# Patient Record
Sex: FEMALE | Race: AMERICAN INDIAN OR ALASKA NATIVE
[De-identification: names, ages, dates, MRNs, and addresses within clinical notes are randomized per-mention and may not be internally consistent; named-entity substitution may affect disease eponyms.]

---

## 2019-02-03 ENCOUNTER — HOSPITAL ENCOUNTER (EMERGENCY)
Dept: HOSPITAL 5 - ED | Age: 39
LOS: 1 days | Discharge: HOME | End: 2019-02-04
Payer: COMMERCIAL

## 2019-02-03 VITALS — DIASTOLIC BLOOD PRESSURE: 58 MMHG | SYSTOLIC BLOOD PRESSURE: 185 MMHG

## 2019-02-03 DIAGNOSIS — K02.9: Primary | ICD-10-CM

## 2019-02-03 DIAGNOSIS — Z90.710: ICD-10-CM

## 2019-02-03 DIAGNOSIS — K05.10: ICD-10-CM

## 2019-02-03 DIAGNOSIS — J44.9: ICD-10-CM

## 2019-02-03 PROCEDURE — 99282 EMERGENCY DEPT VISIT SF MDM: CPT

## 2019-02-03 NOTE — EMERGENCY DEPARTMENT REPORT
HPI





- General


Chief Complaint: Dental/Oral


Time Seen by Provider: 02/03/19 23:08





- HPI


HPI: 





Patient reported that she is having dental pain and it has been going on for a 

few days and she is here to get treatment.  Denies any nausea or vomiting or 

nasal congestion.  Denies any fevers chills.  Pain is 10 out of 10 and achy 

worsen or eating and talking and no alleviating factors.  No medication taken 

prior to coming to the emergency room.  Denies any cough or congestion .  Denies

any chest pain or shortness of breath.  Patient report right dental pain does 

radiate into her left ear.





ED Past Medical Hx





- Past Medical History


Previous Medical History?: Yes


Hx Seizures: Yes


Hx COPD: Yes


Additional medical history: Epilepsy, GI issues,VERTIGO





- Surgical History


Past Surgical History?: Yes


Additional Surgical History: Hysterectomy, C section,TUBILIGATION,ABD 

BIOPSIES,D/C





- Family History


Family history: hypertension





- Social History


Smoking Status: Never Smoker


Substance Use Type: None





- Medications


Home Medications: 


                                Home Medications











 Medication  Instructions  Recorded  Confirmed  Last Taken  Type


 


ALBUTEROL Inhaler (OR & NICU) 2 puff IH QID PRN #1 inhalation 10/24/18  Unknown 

Rx





[ProAir HFA Inhaler]     


 


Azithromycin [Zithromax Z-ERIKA] 250 mg PO DAILY #6 tablet 10/24/18  Unknown Rx


 


Benzonatate [Tessalon Perles] 100 mg PO Q8HR #10 capsule 10/24/18  Unknown Rx


 


predniSONE [Deltasone] 20 mg PO QDAY #5 tab 10/24/18  Unknown Rx


 


Acetaminophen/Codeine [Tylenol 1 tab PO Q6H PRN #14 tab 02/04/19  Unknown Rx





/Codeine # 3 tab]     


 


Clindamycin [Clindamycin CAP] 300 mg PO Q8H 10 Days #30 cap 02/04/19  Unknown Rx


 


Ibuprofen [Motrin] 800 mg PO Q8HR PRN #12 tablet 02/04/19  Unknown Rx














ED Review of Systems


ROS: 


Stated complaint: TOOTH PAIN/JAW PAIN


Other details as noted in HPI





Constitutional: denies: chills, weakness


Eyes: denies: eye pain, eye discharge


ENT: ear pain, dental pain.  denies: throat pain, hearing loss, epistaxis, 

congestion


Respiratory: denies: cough, shortness of breath, wheezing


Cardiovascular: denies: chest pain, palpitations


Gastrointestinal: denies: nausea, vomiting


Musculoskeletal: denies: back pain, arthralgia


Skin: denies: rash


Neurological: denies: headache





Physical Exam





- Physical Exam


Vital Signs: 


                                   Vital Signs











  02/03/19





  21:51


 


Temperature 97.9 F


 


Pulse Rate 79


 


Respiratory 18





Rate 


 


Blood Pressure 185/58


 


O2 Sat by Pulse 99





Oximetry 











General: 





This is a 38-year-old female well-nourished well-developed in no acute distress.


Physical Exam: 





Head: Normocephalic atraumatic


Ears:BIateral TM pearly gray .  Neel EAC with normal exam.  No mastoid bone 

tenderness.


Mouth: Moist, no pharyngeal erythema or exudate .  Tongue is normal and oral 

airways patent.  Uvula is midline.  Patient has dental caries with minimal 

dental tenderness to right upper and lower gumline.  


Neck: Nontender to palpate, supple, normal range of motion. No adenopathy. No c-

spine tenderness.  


 Nose: Bilateral nasal mucosa normal exam maxillary and frontal sinuses non- 

tender to palpate. 


 Eyes: Bilateral Sclerae and  conjunctiva without injection.  Bilateral pupils 

equal and reactive to light.  Bilateral lids are normal.    Normal 

accommodation.BEOMI


Lungs: Clear to auscultate bilaterally, no rhonchi wheezes or rales.  Normal 

work of breathing and no chest wall tenderness


CV: S1, S2.  Regular rate and rhythm negative murmur.  Capillary refill is less 

than 3 seconds


Psych: Normal mood and behavior  





ED Course


                                   Vital Signs











  02/03/19





  21:51


 


Temperature 97.9 F


 


Pulse Rate 79


 


Respiratory 18





Rate 


 


Blood Pressure 185/58


 


O2 Sat by Pulse 99





Oximetry 














- Reevaluation(s)


Reevaluation #1: 





02/04/19 01:00


Patient given ibuprofen 800 mg, clindamycin 600 mg by mouth and acetaminophen 2 

tablets by mouth emergency room with positive relief pain


02/12/19 18:19





ED Medical Decision Making





- Medical Decision Making





This is a 30-year-old female here with right dental pain and found to have 

cavities and gingivitis to right upper and lower.  Please refer to exam note for

 details.














Assessment/plan


Dental caries and gingivitis-patient started on clindamycin and will refer to 

dentist and discharged home on clindamycin 


toothache-patient given Motrin and Norco in the emergency room as brought her 

pain from 10/10-5/10.  She is stable and in no acute distress.  We will refer 

her to dentist





I discussed the patient her diagnosis and treatment plan and she was 

understanding and surgical medicine condition with family with prescription for 

Motrin, Tylenol No. 3 and clindamycin.  She voiced understanding additional need

 to follow up with dentist for further evaluation and treatment





Critical care attestation.: 


If time is entered above; I have spent that time in minutes in the direct care 

of this critically ill patient, excluding procedure time.








ED Disposition


Clinical Impression: 


 Toothache, Gingivitis, Dental caries





Disposition: DC-01 TO HOME OR SELFCARE


Is pt being admited?: No


Does the pt Need Aspirin: No


Condition: Stable


Instructions:  Dental Caries (ED), Gingivitis (ED), Toothache (ED)


Additional Instructions: 


Please gargle with Listerine mouthwash 2 times a day


FLoss 2 times a day


Follow-up with dental clinic within 3-5 days.  See multiple referrals.  If he 

cannot get in with [facility] then tried to make appointment with other dental 

clinic.


Take antibiotic as instructed


Please do not drive or operate heavy machinery while taking Tylenol 3 as this 

medication causes drowsiness.  Take this medication for moderate to severe pain 

and take Motrin for mild to moderate pain.  


Prescriptions: 


Acetaminophen/Codeine [Tylenol /Codeine # 3 tab] 1 tab PO Q6H PRN #14 tab


 PRN Reason: moderate to severe pain


Clindamycin [Clindamycin CAP] 300 mg PO Q8H 10 Days #30 cap


Ibuprofen [Motrin] 800 mg PO Q8HR PRN #12 tablet


 PRN Reason: pain


Referrals: 


[facility] [Other] - 2-3 Days


[facility] [Outside] - 3-5 Days


[facility] [Outside] - 3-5 Days


[facility] [Outside] - 3-5 Days


Forms:  Work/School Release Form(ED)

## 2021-09-13 ENCOUNTER — HOSPITAL ENCOUNTER (EMERGENCY)
Dept: HOSPITAL 5 - ED | Age: 41
Discharge: HOME | End: 2021-09-13
Payer: COMMERCIAL

## 2021-09-13 VITALS — SYSTOLIC BLOOD PRESSURE: 139 MMHG | DIASTOLIC BLOOD PRESSURE: 79 MMHG

## 2021-09-13 DIAGNOSIS — R42: ICD-10-CM

## 2021-09-13 DIAGNOSIS — R10.32: Primary | ICD-10-CM

## 2021-09-13 DIAGNOSIS — J44.9: ICD-10-CM

## 2021-09-13 DIAGNOSIS — Z91.013: ICD-10-CM

## 2021-09-13 DIAGNOSIS — G40.909: ICD-10-CM

## 2021-09-13 DIAGNOSIS — Z98.890: ICD-10-CM

## 2021-09-13 DIAGNOSIS — R19.7: ICD-10-CM

## 2021-09-13 LAB
ALBUMIN SERPL-MCNC: 4.4 G/DL (ref 3.9–5)
ALT SERPL-CCNC: 28 UNITS/L (ref 7–56)
BASOPHILS # (AUTO): 0 K/MM3 (ref 0–0.1)
BASOPHILS NFR BLD AUTO: 0.4 % (ref 0–1.8)
BILIRUB UR QL STRIP: (no result)
BLOOD UR QL VISUAL: (no result)
BUN SERPL-MCNC: 9 MG/DL (ref 7–17)
BUN/CREAT SERPL: 15 %
CALCIUM SERPL-MCNC: 9.6 MG/DL (ref 8.4–10.2)
EOSINOPHIL # BLD AUTO: 0.1 K/MM3 (ref 0–0.4)
EOSINOPHIL NFR BLD AUTO: 1.1 % (ref 0–4.3)
HCT VFR BLD CALC: 40.8 % (ref 30.3–42.9)
HEMOLYSIS INDEX: 10
HGB BLD-MCNC: 13.6 GM/DL (ref 10.1–14.3)
LYMPHOCYTES # BLD AUTO: 2.3 K/MM3 (ref 1.2–5.4)
LYMPHOCYTES NFR BLD AUTO: 36.5 % (ref 13.4–35)
MCHC RBC AUTO-ENTMCNC: 33 % (ref 30–34)
MCV RBC AUTO: 89 FL (ref 79–97)
MONOCYTES # (AUTO): 0.5 K/MM3 (ref 0–0.8)
MONOCYTES % (AUTO): 7.4 % (ref 0–7.3)
PH UR STRIP: 7 [PH] (ref 5–7)
PLATELET # BLD: 248 K/MM3 (ref 140–440)
PROT UR STRIP-MCNC: (no result) MG/DL
RBC # BLD AUTO: 4.6 M/MM3 (ref 3.65–5.03)
RBC #/AREA URNS HPF: 1 /HPF (ref 0–6)
UROBILINOGEN UR-MCNC: 4 MG/DL (ref ?–2)
WBC #/AREA URNS HPF: 1 /HPF (ref 0–6)

## 2021-09-13 PROCEDURE — 85025 COMPLETE CBC W/AUTO DIFF WBC: CPT

## 2021-09-13 PROCEDURE — 83690 ASSAY OF LIPASE: CPT

## 2021-09-13 PROCEDURE — 96376 TX/PRO/DX INJ SAME DRUG ADON: CPT

## 2021-09-13 PROCEDURE — 74177 CT ABD & PELVIS W/CONTRAST: CPT

## 2021-09-13 PROCEDURE — 96374 THER/PROPH/DIAG INJ IV PUSH: CPT

## 2021-09-13 PROCEDURE — 96361 HYDRATE IV INFUSION ADD-ON: CPT

## 2021-09-13 PROCEDURE — 36415 COLL VENOUS BLD VENIPUNCTURE: CPT

## 2021-09-13 PROCEDURE — 96375 TX/PRO/DX INJ NEW DRUG ADDON: CPT

## 2021-09-13 PROCEDURE — 84703 CHORIONIC GONADOTROPIN ASSAY: CPT

## 2021-09-13 PROCEDURE — 99284 EMERGENCY DEPT VISIT MOD MDM: CPT

## 2021-09-13 PROCEDURE — 80053 COMPREHEN METABOLIC PANEL: CPT

## 2021-09-13 PROCEDURE — 81001 URINALYSIS AUTO W/SCOPE: CPT

## 2021-09-13 NOTE — EMERGENCY DEPARTMENT REPORT
ED Abdominal Pain HPI





- General


Chief Complaint: Abdominal Pain


Stated Complaint: LO LT STOMACH PAIN, BLOOD DIARRHEA


Time Seen by Provider: 21 14:29


Source: patient


Mode of arrival: Ambulatory


Limitations: No Limitations





- History of Present Illness


Initial Comments: 





Patient is a 40-year-old female presents emergency room complaints of left lower

quadrant abdominal pain that began yesterday around 10 AM.  She has associated 

diarrhea.  She states that she has had multiple episodes.  She also has nausea. 

She states that she has seen some bright red blood in her diarrhea.  She denies 

any vomiting, fever, melena, urinary symptoms.  She has a past medical history 

of IBS, diverticulitis, COPD, hypertension, epilepsy.  She states that she does 

see a GI doctor regularly and last saw them 1 month ago.  She states that she 

goes approximately every 4 months.  She has an allergy to amoxicillin.  She 

states that she had a colonoscopy in May 2021 and had polyps removed at that 

time.





- Related Data


                                  Previous Rx's











 Medication  Instructions  Recorded  Last Taken  Type


 


Albuterol Mdi (or & Nicu Only) 2 puff IH QID PRN #1 inhalation 10/24/18 Unknown 

Rx





[ProAir HFA Inhaler]    


 


Azithromycin [Zithromax Z-ERIKA] 250 mg PO DAILY #6 tablet 10/24/18 Unknown Rx


 


Benzonatate [Tessalon Perles] 100 mg PO Q8HR #10 capsule 10/24/18 Unknown Rx


 


predniSONE [Deltasone] 20 mg PO QDAY #5 tab 10/24/18 Unknown Rx


 


Acetaminophen/Codeine [Tylenol 1 tab PO Q6H PRN #14 tab 19 Unknown Rx





/Codeine # 3 tab]    


 


Clindamycin [Clindamycin CAP] 300 mg PO Q8H 10 Days #30 cap 19 Unknown Rx


 


Ibuprofen [Motrin] 800 mg PO Q8HR PRN #12 tablet 19 Unknown Rx


 


Ciprofloxacin HCl [Ciprofloxacin 500 mg PO BID 7 Days #28 tablet 21 

Unknown Rx





TAB]    


 


Hyoscyamine Subl [Levsin Sl 0.125 0.125 mg SL Q6HR PRN #10 tab 21 Unknown 

Rx





TAB]    


 


Promethazine [Phenergan] 25 mg PO Q8HR PRN #10 tab 21 Unknown Rx


 


metroNIDAZOLE [Flagyl] 500 mg PO BID 7 Days #14 tab 21 Unknown Rx


 


traMADoL [Ultram 50 MG tab] 50 mg PO Q6HR PRN #12 tablet 21 Unknown Rx











                                    Allergies











Allergy/AdvReac Type Severity Reaction Status Date / Time


 


shellfish derived Allergy  Unknown Verified 19 21:55














ED Review of Systems


ROS: 


Stated complaint: LO LT STOMACH PAIN, BLOOD DIARRHEA


Other details as noted in HPI





Comment: All other systems reviewed and negative





ED Past Medical Hx





- Past Medical History


Previous Medical History?: Yes


Hx Seizures: Yes


Hx COPD: Yes


Additional medical history: Epilepsy, GI issues,VERTIGO





- Surgical History


Past Surgical History?: Yes


Additional Surgical History: Hysterectomy, C section,TUBILIGATION,ABD 

BIOPSIES,D/C





- Social History


Smoking Status: Never Smoker


Substance Use Type: None





- Medications


Home Medications: 


                                Home Medications











 Medication  Instructions  Recorded  Confirmed  Last Taken  Type


 


Albuterol Mdi (or & Nicu Only) 2 puff IH QID PRN #1 inhalation 10/24/18  Unknown

Rx





[ProAir HFA Inhaler]     


 


Azithromycin [Zithromax Z-ERIKA] 250 mg PO DAILY #6 tablet 10/24/18  Unknown Rx


 


Benzonatate [Tessalon Perles] 100 mg PO Q8HR #10 capsule 10/24/18  Unknown Rx


 


predniSONE [Deltasone] 20 mg PO QDAY #5 tab 10/24/18  Unknown Rx


 


Acetaminophen/Codeine [Tylenol 1 tab PO Q6H PRN #14 tab 19  Unknown Rx





/Codeine # 3 tab]     


 


Clindamycin [Clindamycin CAP] 300 mg PO Q8H 10 Days #30 cap 19  Unknown Rx


 


Ibuprofen [Motrin] 800 mg PO Q8HR PRN #12 tablet 19  Unknown Rx


 


Ciprofloxacin HCl [Ciprofloxacin 500 mg PO BID 7 Days #28 tablet 21  

Unknown Rx





TAB]     


 


Hyoscyamine Subl [Levsin Sl 0.125 0.125 mg SL Q6HR PRN #10 tab 21  Unknown

 Rx





TAB]     


 


Promethazine [Phenergan] 25 mg PO Q8HR PRN #10 tab 21  Unknown Rx


 


metroNIDAZOLE [Flagyl] 500 mg PO BID 7 Days #14 tab 21  Unknown Rx


 


traMADoL [Ultram 50 MG tab] 50 mg PO Q6HR PRN #12 tablet 21  Unknown Rx














ED Physical Exam





- General


Limitations: No Limitations


General appearance: alert, in no apparent distress





- Head


Head exam: Present: atraumatic, normocephalic





- Eye


Eye exam: Present: normal appearance





- ENT


ENT exam: Present: mucous membranes moist





- Respiratory


Respiratory exam: Present: normal lung sounds bilaterally.  Absent: respiratory 

distress, wheezes, rales, rhonchi, stridor, chest wall tenderness, accessory 

muscle use, decreased breath sounds, prolonged expiratory





- Cardiovascular


Cardiovascular Exam: Present: regular rate, normal rhythm, normal heart sounds. 

Absent: systolic murmur, diastolic murmur, rubs, gallop





- GI/Abdominal


GI/Abdominal exam: Present: soft, tenderness (LLQ), normal bowel sounds.  

Absent: distended, guarding, rebound, rigid





- Neurological Exam


Neurological exam: Present: alert, oriented X3





- Psychiatric


Psychiatric exam: Present: normal affect, normal mood





- Skin


Skin exam: Present: warm, dry, intact





ED Course


                                   Vital Signs











  21





  13:45


 


Temperature 98.2 F


 


Pulse Rate 82


 


Respiratory 15





Rate 


 


Blood Pressure 139/79


 


O2 Sat by Pulse 97





Oximetry 














ED Medical Decision Making





- Lab Data


Result diagrams: 


                                 21 15:09





                                 21 15:09








                                   Lab Results











  21 Range/Units





  14:33 15:09 15:09 


 


WBC   6.4   (4.5-11.0)  K/mm3


 


RBC   4.60   (3.65-5.03)  M/mm3


 


Hgb   13.6   (10.1-14.3)  gm/dl


 


Hct   40.8   (30.3-42.9)  %


 


MCV   89   (79-97)  fl


 


MCH   30   (28-32)  pg


 


MCHC   33   (30-34)  %


 


RDW   13.6   (13.2-15.2)  %


 


Plt Count   248   (140-440)  K/mm3


 


Lymph % (Auto)   36.5 H   (13.4-35.0)  %


 


Mono % (Auto)   7.4 H   (0.0-7.3)  %


 


Eos % (Auto)   1.1   (0.0-4.3)  %


 


Baso % (Auto)   0.4   (0.0-1.8)  %


 


Lymph # (Auto)   2.3   (1.2-5.4)  K/mm3


 


Mono # (Auto)   0.5   (0.0-0.8)  K/mm3


 


Eos # (Auto)   0.1   (0.0-0.4)  K/mm3


 


Baso # (Auto)   0.0   (0.0-0.1)  K/mm3


 


Seg Neutrophils %   54.6   (40.0-70.0)  %


 


Seg Neutrophils #   3.5   (1.8-7.7)  K/mm3


 


Sodium    137  (137-145)  mmol/L


 


Potassium    4.4  (3.6-5.0)  mmol/L


 


Chloride    100.6  ()  mmol/L


 


Carbon Dioxide    29  (22-30)  mmol/L


 


Anion Gap    12  mmol/L


 


BUN    9  (7-17)  mg/dL


 


Creatinine    0.6  (0.6-1.2)  mg/dL


 


Estimated GFR    > 60  ml/min


 


BUN/Creatinine Ratio    15  %


 


Glucose    91  ()  mg/dL


 


Calcium    9.6  (8.4-10.2)  mg/dL


 


Total Bilirubin    0.50  (0.1-1.2)  mg/dL


 


AST    25  (5-40)  units/L


 


ALT    28  (7-56)  units/L


 


Alkaline Phosphatase    85  ()  units/L


 


Total Protein    7.9  (6.3-8.2)  g/dL


 


Albumin    4.4  (3.9-5)  g/dL


 


Albumin/Globulin Ratio    1.3  %


 


Lipase    39  (13-60)  units/L


 


HCG, Qual     (Negative)  


 


Urine Color  Yellow    (Yellow)  


 


Urine Turbidity  Clear    (Clear)  


 


Urine pH  7.0    (5.0-7.0)  


 


Ur Specific Gravity  1.016    (1.003-1.030)  


 


Urine Protein  <15 mg/dl    (Negative)  mg/dL


 


Urine Glucose (UA)  Neg    (Negative)  mg/dL


 


Urine Ketones  Neg    (Negative)  mg/dL


 


Urine Blood  Neg    (Negative)  


 


Urine Nitrite  Neg    (Negative)  


 


Urine Bilirubin  Neg    (Negative)  


 


Urine Urobilinogen  4.0    (<2.0)  mg/dL


 


Ur Leukocyte Esterase  Neg    (Negative)  


 


Urine WBC (Auto)  1.0    (0.0-6.0)  /HPF


 


Urine RBC (Auto)  1.0    (0.0-6.0)  /HPF


 


U Epithel Cells (Auto)  4.0    (0-13.0)  /HPF














  09/13/21 Range/Units





  15:09 


 


WBC   (4.5-11.0)  K/mm3


 


RBC   (3.65-5.03)  M/mm3


 


Hgb   (10.1-14.3)  gm/dl


 


Hct   (30.3-42.9)  %


 


MCV   (79-97)  fl


 


MCH   (28-32)  pg


 


MCHC   (30-34)  %


 


RDW   (13.2-15.2)  %


 


Plt Count   (140-440)  K/mm3


 


Lymph % (Auto)   (13.4-35.0)  %


 


Mono % (Auto)   (0.0-7.3)  %


 


Eos % (Auto)   (0.0-4.3)  %


 


Baso % (Auto)   (0.0-1.8)  %


 


Lymph # (Auto)   (1.2-5.4)  K/mm3


 


Mono # (Auto)   (0.0-0.8)  K/mm3


 


Eos # (Auto)   (0.0-0.4)  K/mm3


 


Baso # (Auto)   (0.0-0.1)  K/mm3


 


Seg Neutrophils %   (40.0-70.0)  %


 


Seg Neutrophils #   (1.8-7.7)  K/mm3


 


Sodium   (137-145)  mmol/L


 


Potassium   (3.6-5.0)  mmol/L


 


Chloride   ()  mmol/L


 


Carbon Dioxide   (22-30)  mmol/L


 


Anion Gap   mmol/L


 


BUN   (7-17)  mg/dL


 


Creatinine   (0.6-1.2)  mg/dL


 


Estimated GFR   ml/min


 


BUN/Creatinine Ratio   %


 


Glucose   ()  mg/dL


 


Calcium   (8.4-10.2)  mg/dL


 


Total Bilirubin   (0.1-1.2)  mg/dL


 


AST   (5-40)  units/L


 


ALT   (7-56)  units/L


 


Alkaline Phosphatase   ()  units/L


 


Total Protein   (6.3-8.2)  g/dL


 


Albumin   (3.9-5)  g/dL


 


Albumin/Globulin Ratio   %


 


Lipase   (13-60)  units/L


 


HCG, Qual  Negative  (Negative)  


 


Urine Color   (Yellow)  


 


Urine Turbidity   (Clear)  


 


Urine pH   (5.0-7.0)  


 


Ur Specific Gravity   (1.003-1.030)  


 


Urine Protein   (Negative)  mg/dL


 


Urine Glucose (UA)   (Negative)  mg/dL


 


Urine Ketones   (Negative)  mg/dL


 


Urine Blood   (Negative)  


 


Urine Nitrite   (Negative)  


 


Urine Bilirubin   (Negative)  


 


Urine Urobilinogen   (<2.0)  mg/dL


 


Ur Leukocyte Esterase   (Negative)  


 


Urine WBC (Auto)   (0.0-6.0)  /HPF


 


Urine RBC (Auto)   (0.0-6.0)  /HPF


 


U Epithel Cells (Auto)   (0-13.0)  /HPF














- Radiology Data


Radiology results: report reviewed





Ordering Physician: KAYKAY STARK  


Date of Service: 21  


Procedure(s): CT abdomen pelvis w con  


Accession Number(s): [id number]  


 


cc: KAYKAY STARK   


 


 


 


                                            **ADDENDUM**  


Addendum: The patient received 100 cc Omnipaque 300 intravenously.  


 


 Signer Name: Cedric Moran MD   


 Signed: 2021 6:29 PM  


 Workstation Name: [id number]   


 


 


Addendum Transcribed By: RT  


Addendum Dictated By: Cedric Moran MD                                      

             


Addendum Electronically Authenticated By: Cedric Moran MD  


Addendum Signed Date/Time: 21                                        

           


 


 


DD/DT:                                                 


TD/TT: /                                                            


CT OF THE ABDOMEN AND PELVIS WITH INTRAVENOUS CONTRAST   


 


 INDICATION / CLINICAL INFORMATION: LLQ abd pain, diarrhea, and blood in stool. 




 


 TECHNIQUE: The patient received All CT scans at this location are performed 

using CT dose reduction


for ALARA by means of automated exposure control.   


 


 COMPARISON: None available.  


 


 FINDINGS:  


 ABDOMEN: The liver measures 18.1 cm in length and demonstrates moderate 

generalized decreased 


density compared to the spleen. There is a tiny simple cyst in the right lobe of

the liver. The 


gallbladder, bile ducts, pancreas, spleen, right adrenal gland and kidneys are 

normal. There is mild


benign-appearing low density nodularity/hyperplasia involving the left adrenal 

gland.  


 


 There is mild increased density involving the fat of the root of the small 

bowel mesentery. Several


tiny lymph nodes are scattered throughout this region. The bowel is normal 

without evidence of 


obstruction, wall thickening or free air. The lung bases are clear.  


 


 PELVIS: The distal ureters and urinary bladder are normal. The uterus and 

ovaries have probably 


been removed. There is a 2.5 cm cystic lesion along the superior margin of the 

vagina, just to the 


right of midline. This does not appear to represent bowel. There is minimal free

fluid in the 


cul-de-sac.  


 


 A normal appendix is present and there is no evidence of diverticulitis. I do 

not identify a 


hernia. No acute osseous abnormality is seen.  


 


 IMPRESSION:  


 1. Small nonspecific cystic area along the superior margin of the vagina is of 

uncertain etiology 


or clinical significance. There is minimal free fluid in the cul-de-sac.  


 2. Findings characteristic of mild sclerosing mesenteritis.  


 3. Mild hepatomegaly with moderate diffuse fatty infiltration.  


 


 Signer Name: Cedric Moran MD   


 Signed: 2021 6:24 PM  


 Workstation Name: [id number]   


 


 


Transcribed By: RT  


Dictated By: Cedric Moran MD  


Electronically Authenticated By: Cedric Moran MD    


Signed Date/Time: 21                                


 


 


 


DD/DT: 21                                                            

 


TD/TT:


--


[Addendum Report Added by CEDRIC MORAN at 2021 18:36:14]





[facility]  


                                     [address] [place] [address]  


 


                                          Cat Scan Report   


                                               Signed  


 


Patient: KASIE LABOY                                                 

              MR#:   


[id number]          


: 1980                                                                

Acct:[id number]      


 


Age/Sex: 40 / F                                                                

ADM Date: 21     


 


Loc: ED       


Attending Dr:   


 


 


Ordering Physician: KAYKAY STARK  


Date of Service: 21  


Procedure(s): CT abdomen pelvis w con  


Accession Number(s): [id number]  


 


cc: KAYKAY STARK   


 


 


CT OF THE ABDOMEN AND PELVIS WITH INTRAVENOUS CONTRAST   


 


 INDICATION / CLINICAL INFORMATION: LLQ abd pain, diarrhea, and blood in stool. 




 


 TECHNIQUE: The patient received All CT scans at this location are performed 

using CT dose reduction


for ALARA by means of automated exposure control.   


 


 COMPARISON: None available.  


 


 FINDINGS:  


 ABDOMEN: The liver measures 18.1 cm in length and demonstrates moderate 

generalized decreased 


density compared to the spleen. There is a tiny simple cyst in the right lobe of

the liver. The 


gallbladder, bile ducts, pancreas, spleen, right adrenal gland and kidneys are 

normal. There is mild


benign-appearing low density nodularity/hyperplasia involving the left adrenal 

gland.  


 


 There is mild increased density involving the fat of the root of the small 

bowel mesentery. Several


tiny lymph nodes are scattered throughout this region. The bowel is normal 

without evidence of 


obstruction, wall thickening or free air. The lung bases are clear.  


 


 PELVIS: The distal ureters and urinary bladder are normal. The uterus and 

ovaries have probably 


been removed. There is a 2.5 cm cystic lesion along the superior margin of the 

vagina, just to the 


right of midline. This does not appear to represent bowel. There is minimal free

fluid in the 


cul-de-sac.  


 


 A normal appendix is present and there is no evidence of diverticulitis. I do 

not identify a 


hernia. No acute osseous abnormality is seen.  


 


 IMPRESSION:  


 1. Small nonspecific cystic area along the superior margin of the vagina is of 

uncertain etiology 


or clinical significance. There is minimal free fluid in the cul-de-sac.  


 2. Findings characteristic of mild sclerosing mesenteritis.  


 3. Mild hepatomegaly with moderate diffuse fatty infiltration.  


 


 Signer Name: Cedric Moran MD   


 Signed: 2021 6:24 PM  


 Workstation Name: [id number]   


 


 


Transcribed By: RT  


Dictated By: Cedric Moran MD  


Electronically Authenticated By: Cedric Moran MD    


Signed Date/Time: 21                                


 


 


 


DD/DT: 21                                                            

 


TD/TT:
































- Medical Decision Making





Patient is a 40-year-old female presents emergency room complaints of left lower

quadrant abdominal pain that began yesterday around 10 AM.  She has associated 

diarrhea.  She states that she has had multiple episodes.  She also has nausea. 

She states that she has seen some bright red blood in her diarrhea.  She denies 

any vomiting, fever, melena, urinary symptoms.  She has a past medical history 

of IBS, diverticulitis, COPD, hypertension, epilepsy.  She states that she does 

see a GI doctor regularly and last saw them 1 month ago.  She states that she 

goes approximately every 4 months.  She has an allergy to amoxicillin.  She 

states that she had a colonoscopy in May 2021 and had polyps removed at that 

time.  Vitals are stable.  On exam patient has left lower quadrant tenderness 

palpation, no guarding, no rebound, no rigidity, no peritoneal signs.  Labs are 

normal.  UA was within normal limits.  CT abdomen pelvis with IV contrast:  1. 

Small nonspecific cystic area along the superior margin of the vagina is of 

uncertain etiology or clinical significance. There is minimal free fluid in the 

cul-de-sac.   2. Findings characteristic of mild sclerosing mesenteritis.   3. 

Mild hepatomegaly with moderate diffuse fatty infiltration.  Discussed all 

results with patient.  I asked patient if she has been having any vaginal pain 

or swelling or is noticed any cyst, she states know that she only has a history 

of ovarian cyst but is not having any  issues, I discussed CT findings and the

importance of OB/GYN follow-up.  Advised patient to follow-up with GI.  Patient 

given prescription for medications.  Advised patient Please take medication as 

prescribed.  Increase your water intake.  Eat a bland liquid diet so advance her

diet as tolerated.  Follow-up with a primary care doctor.  Follow-up with a GI 

doctor.  Follow-up with a OB/GYN.  I have given you a copy of your CT report 

please take this to the primary care doctor and OB/GYN.  Return to emergency 

room for any new or worsening symptoms.


Critical care attestation.: 


If time is entered above; I have spent that time in minutes in the direct care 

of this critically ill patient, excluding procedure time.








ED Disposition


Clinical Impression: 


Abdominal pain


Qualifiers:


 Abdominal location: left lower quadrant Qualified Code(s): R10.32 - Left lower 

quadrant pain





Diarrhea


Qualifiers:


 Diarrhea type: unspecified type Qualified Code(s): R19.7 - Diarrhea, 

unspecified





Disposition: 01 HOME / SELF CARE / HOMELESS


Is pt being admited?: No


Does the pt Need Aspirin: No


Condition: Stable


Instructions:  Abdominal Pain (ED)


Additional Instructions: 


Please take medication as prescribed.  Increase your water intake.  Eat a bland 

liquid diet so advance her diet as tolerated.  Follow-up with a primary care 

doctor.  Follow-up with a GI doctor.  Follow-up with a OB/GYN.  I have given you

a copy of your CT report please take this to the primary care doctor and OB/GYN.

 Return to emergency room for any new or worsening symptoms.


Prescriptions: 


Ciprofloxacin HCl [Ciprofloxacin TAB] 500 mg PO BID 7 Days #28 tablet


metroNIDAZOLE [Flagyl] 500 mg PO BID 7 Days #14 tab


Hyoscyamine Subl [Levsin Sl 0.125 TAB] 0.125 mg SL Q6HR PRN #10 tab


 PRN Reason: diarrhea/abd pain


Promethazine [Phenergan] 25 mg PO Q8HR PRN #10 tab


 PRN Reason: nausea/vomiting


traMADoL [Ultram 50 MG tab] 50 mg PO Q6HR PRN #12 tablet


 PRN Reason: Pain


Referrals: 


[facility] [Primary Care Provider] - 3-5 Days


your, GI doctor [Other] - 3-5 Days


[facility] [Provider Group] - 3-5 Days


Time of Disposition: 18:59


Print Language: ENGLISH

## 2021-09-13 NOTE — CAT SCAN REPORT
CT OF THE ABDOMEN AND PELVIS WITH INTRAVENOUS CONTRAST 



INDICATION / CLINICAL INFORMATION: LLQ abd pain, diarrhea, and blood in stool.



TECHNIQUE: The patient received All CT scans at this location are performed using CT dose reduction f
or ALARA by means of automated exposure control. 



COMPARISON: None available.



FINDINGS:

ABDOMEN: The liver measures 18.1 cm in length and demonstrates moderate generalized decreased density
 compared to the spleen. There is a tiny simple cyst in the right lobe of the liver. The gallbladder,
 bile ducts, pancreas, spleen, right adrenal gland and kidneys are normal. There is mild benign-appea
ring low density nodularity/hyperplasia involving the left adrenal gland.



There is mild increased density involving the fat of the root of the small bowel mesentery. Several t
iny lymph nodes are scattered throughout this region. The bowel is normal without evidence of obstruc
tion, wall thickening or free air. The lung bases are clear.



PELVIS: The distal ureters and urinary bladder are normal. The uterus and ovaries have probably been 
removed. There is a 2.5 cm cystic lesion along the superior margin of the vagina, just to the right o
f midline. This does not appear to represent bowel. There is minimal free fluid in the cul-de-sac.



A normal appendix is present and there is no evidence of diverticulitis. I do not identify a hernia. 
No acute osseous abnormality is seen.



IMPRESSION:

1. Small nonspecific cystic area along the superior margin of the vagina is of uncertain etiology or 
clinical significance. There is minimal free fluid in the cul-de-sac.

2. Findings characteristic of mild sclerosing mesenteritis.

3. Mild hepatomegaly with moderate diffuse fatty infiltration.



Signer Name: Devon Moran MD 

Signed: 9/13/2021 6:24 PM

Workstation Name: [id number]

## 2021-12-17 ENCOUNTER — HOSPITAL ENCOUNTER (EMERGENCY)
Dept: HOSPITAL 5 - ED | Age: 41
Discharge: HOME | End: 2021-12-17
Payer: COMMERCIAL

## 2021-12-17 VITALS — SYSTOLIC BLOOD PRESSURE: 134 MMHG | DIASTOLIC BLOOD PRESSURE: 88 MMHG

## 2021-12-17 DIAGNOSIS — R10.32: Primary | ICD-10-CM

## 2021-12-17 DIAGNOSIS — Z91.013: ICD-10-CM

## 2021-12-17 DIAGNOSIS — Z88.0: ICD-10-CM

## 2021-12-17 LAB
ALBUMIN SERPL-MCNC: 4.4 G/DL (ref 3.9–5)
ALT SERPL-CCNC: 22 UNITS/L (ref 7–56)
BASOPHILS # (AUTO): 0.1 K/MM3 (ref 0–0.1)
BASOPHILS NFR BLD AUTO: 1.3 % (ref 0–1.8)
BILIRUB UR QL STRIP: (no result)
BLOOD UR QL VISUAL: (no result)
BUN SERPL-MCNC: 10 MG/DL (ref 7–17)
BUN/CREAT SERPL: 17 %
CALCIUM SERPL-MCNC: 9.3 MG/DL (ref 8.4–10.2)
EOSINOPHIL # BLD AUTO: 0.1 K/MM3 (ref 0–0.4)
EOSINOPHIL NFR BLD AUTO: 1 % (ref 0–4.3)
HCT VFR BLD CALC: 39.9 % (ref 30.3–42.9)
HEMOLYSIS INDEX: 30
HGB BLD-MCNC: 12.4 GM/DL (ref 10.1–14.3)
LYMPHOCYTES # BLD AUTO: 2.3 K/MM3 (ref 1.2–5.4)
LYMPHOCYTES NFR BLD AUTO: 38.9 % (ref 13.4–35)
MCHC RBC AUTO-ENTMCNC: 31 % (ref 30–34)
MCV RBC AUTO: 88 FL (ref 79–97)
MONOCYTES # (AUTO): 0.3 K/MM3 (ref 0–0.8)
MONOCYTES % (AUTO): 5.5 % (ref 0–7.3)
MUCOUS THREADS #/AREA URNS HPF: (no result) /HPF
PH UR STRIP: 5 [PH] (ref 5–7)
PLATELET # BLD: 253 K/MM3 (ref 140–440)
PROT UR STRIP-MCNC: (no result) MG/DL
RBC # BLD AUTO: 4.53 M/MM3 (ref 3.65–5.03)
RBC #/AREA URNS HPF: < 1 /HPF (ref 0–6)
UROBILINOGEN UR-MCNC: < 2 MG/DL (ref ?–2)
WBC #/AREA URNS HPF: < 1 /HPF (ref 0–6)

## 2021-12-17 PROCEDURE — 36415 COLL VENOUS BLD VENIPUNCTURE: CPT

## 2021-12-17 PROCEDURE — 85025 COMPLETE CBC W/AUTO DIFF WBC: CPT

## 2021-12-17 PROCEDURE — 83690 ASSAY OF LIPASE: CPT

## 2021-12-17 PROCEDURE — 81001 URINALYSIS AUTO W/SCOPE: CPT

## 2021-12-17 PROCEDURE — 80053 COMPREHEN METABOLIC PANEL: CPT

## 2021-12-17 PROCEDURE — 84703 CHORIONIC GONADOTROPIN ASSAY: CPT

## 2021-12-17 PROCEDURE — 74176 CT ABD & PELVIS W/O CONTRAST: CPT

## 2021-12-17 PROCEDURE — 99284 EMERGENCY DEPT VISIT MOD MDM: CPT

## 2021-12-17 NOTE — EMERGENCY DEPARTMENT REPORT
ED General Adult HPI





- General


Chief complaint: Abdominal Pain


Stated complaint: ABDOMIANL PAIN


Time Seen by Provider: 12/17/21 15:01


Source: patient


Mode of arrival: Ambulatory


Limitations: No Limitations





- History of Present Illness


Initial comments: 





Patient is a 41-year-old female presents emergency room complaints of left lower

quadrant abdominal pain that increased over the last couple days.  She states 

that she has had changes in her bowel movements and reports that she has bouts 

of diarrhea and constipation.  She has associated nausea she denies any fever, 

vomiting, chills, urinary symptoms, hematochezia, melena, hematemesis.  Patient 

states that she goes to [facility].  She reports that she had a colonoscopy and 

August and she was diagnosed with IBS and polyps but reports that her pathology 

came back normal.  Allergy to amoxicillin and shellfish





- Related Data


                                  Previous Rx's











 Medication  Instructions  Recorded  Last Taken  Type


 


Albuterol Mdi (or & Nicu Only) 2 puff IH QID PRN #1 inhalation 10/24/18 Unknown 

Rx





[ProAir HFA Inhaler]    


 


Azithromycin [Zithromax Z-ERIKA] 250 mg PO DAILY #6 tablet 10/24/18 Unknown Rx


 


Benzonatate [Tessalon Perles] 100 mg PO Q8HR #10 capsule 10/24/18 Unknown Rx


 


predniSONE [Deltasone] 20 mg PO QDAY #5 tab 10/24/18 Unknown Rx


 


Acetaminophen/Codeine [Tylenol 1 tab PO Q6H PRN #14 tab 02/04/19 Unknown Rx





/Codeine # 3 tab]    


 


Clindamycin [Clindamycin CAP] 300 mg PO Q8H 10 Days #30 cap 02/04/19 Unknown Rx


 


Ibuprofen [Motrin] 800 mg PO Q8HR PRN #12 tablet 02/04/19 Unknown Rx


 


Ciprofloxacin HCl [Ciprofloxacin 500 mg PO BID 7 Days #28 tablet 09/13/21 

Unknown Rx





TAB]    


 


Hyoscyamine Subl [Levsin Sl 0.125 0.125 mg SL Q6HR PRN #10 tab 09/13/21 Unknown 

Rx





TAB]    


 


Promethazine [Phenergan] 25 mg PO Q8HR PRN #10 tab 09/13/21 Unknown Rx


 


metroNIDAZOLE [Flagyl] 500 mg PO BID 7 Days #14 tab 09/13/21 Unknown Rx


 


traMADoL [Ultram 50 MG tab] 50 mg PO Q6HR PRN #12 tablet 09/13/21 Unknown Rx


 


Dicyclomine [Bentyl] 10 mg PO QID PRN #30 capsule 12/17/21 Unknown Rx


 


Docusate Sodium [Colace] 100 mg PO BID PRN #30 capsule 12/17/21 Unknown Rx


 


Ondansetron [Zofran Odt] 4 mg PO Q8HR PRN #10 tab.rapdis 12/17/21 Unknown Rx











                                    Allergies











Allergy/AdvReac Type Severity Reaction Status Date / Time


 


amoxicillin Allergy  Swelling Verified 12/17/21 14:35


 


shellfish derived Allergy  Unknown Verified 02/03/19 21:55














ED Review of Systems


ROS: 


Stated complaint: ABDOMIANL PAIN


Other details as noted in HPI





Comment: All other systems reviewed and negative





ED Past Medical Hx





- Past Medical History


Hx Seizures: Yes


Hx COPD: Yes


Additional medical history: Epilepsy, GI issues,VERTIGO/IBS





- Surgical History


Additional Surgical History: Hysterectomy, C section,TUBILIGATION,ABD 

BIOPSIES,D/C





- Social History


Smoking Status: Never Smoker


Substance Use Type: None





- Medications


Home Medications: 


                                Home Medications











 Medication  Instructions  Recorded  Confirmed  Last Taken  Type


 


Albuterol Mdi (or & Nicu Only) 2 puff IH QID PRN #1 inhalation 10/24/18  Unknown

Rx





[ProAir HFA Inhaler]     


 


Azithromycin [Zithromax Z-ERIKA] 250 mg PO DAILY #6 tablet 10/24/18  Unknown Rx


 


Benzonatate [Tessalon Perles] 100 mg PO Q8HR #10 capsule 10/24/18  Unknown Rx


 


predniSONE [Deltasone] 20 mg PO QDAY #5 tab 10/24/18  Unknown Rx


 


Acetaminophen/Codeine [Tylenol 1 tab PO Q6H PRN #14 tab 02/04/19  Unknown Rx





/Codeine # 3 tab]     


 


Clindamycin [Clindamycin CAP] 300 mg PO Q8H 10 Days #30 cap 02/04/19  Unknown Rx


 


Ibuprofen [Motrin] 800 mg PO Q8HR PRN #12 tablet 02/04/19  Unknown Rx


 


Ciprofloxacin HCl [Ciprofloxacin 500 mg PO BID 7 Days #28 tablet 09/13/21  

Unknown Rx





TAB]     


 


Hyoscyamine Subl [Levsin Sl 0.125 0.125 mg SL Q6HR PRN #10 tab 09/13/21  Unknown

 Rx





TAB]     


 


Promethazine [Phenergan] 25 mg PO Q8HR PRN #10 tab 09/13/21  Unknown Rx


 


metroNIDAZOLE [Flagyl] 500 mg PO BID 7 Days #14 tab 09/13/21  Unknown Rx


 


traMADoL [Ultram 50 MG tab] 50 mg PO Q6HR PRN #12 tablet 09/13/21  Unknown Rx


 


Dicyclomine [Bentyl] 10 mg PO QID PRN #30 capsule 12/17/21  Unknown Rx


 


Docusate Sodium [Colace] 100 mg PO BID PRN #30 capsule 12/17/21  Unknown Rx


 


Ondansetron [Zofran Odt] 4 mg PO Q8HR PRN #10 tab.rapdis 12/17/21  Unknown Rx














ED Physical Exam





- General


Limitations: No Limitations


General appearance: alert, in no apparent distress





- Head


Head exam: Present: atraumatic, normocephalic





- Eye


Eye exam: Present: normal appearance





- ENT


ENT exam: Present: mucous membranes moist





- Respiratory


Respiratory exam: Present: normal lung sounds bilaterally.  Absent: respiratory 

distress, wheezes, rales, rhonchi, stridor, chest wall tenderness, accessory 

muscle use, decreased breath sounds, prolonged expiratory





- Cardiovascular


Cardiovascular Exam: Present: regular rate, normal rhythm, normal heart sounds. 

Absent: systolic murmur, diastolic murmur, rubs, gallop





- GI/Abdominal


GI/Abdominal exam: Present: soft, tenderness (LLQ), normal bowel sounds.  

Absent: distended, guarding, rebound, rigid





- Neurological Exam


Neurological exam: Present: alert, oriented X3





- Psychiatric


Psychiatric exam: Present: normal affect, normal mood





- Skin


Skin exam: Present: warm, dry, intact





ED Course


                                   Vital Signs











  12/17/21 12/17/21





  14:38 18:31


 


Temperature 98.0 F 


 


Pulse Rate 71 66


 


Respiratory 24 15





Rate  


 


Blood Pressure 133/85 


 


Blood Pressure  134/88





[Right]  


 


O2 Sat by Pulse 97 99





Oximetry  














ED Medical Decision Making





- Lab Data


Result diagrams: 


                                 12/17/21 15:17





                                 12/17/21 15:17





- Radiology Data


Radiology results: report reviewed





Ordering Physician: KAYKAY STARK  


Date of Service: 12/17/21  


Procedure(s): CT abdomen pelvis wo con  


Accession Number(s): [id number]  


 


cc: KAYKAY STARK   


 


 


CT ABDOMEN AND PELVIS WITHOUT CONTRAST  


 


 HISTORY: LLQ abd pain, nausea  


 


 COMPARISON: Prior CT on 9/13/2021  


 


 TECHNIQUE: Routine abdominal and pelvic CT exam performed Without intravenous 

contrast. Oral 


contrast was administered.  Lack of intravenous contrast limits evaluation of 

the vascular and solid


organs.. All CT scans at this location are performed using CT dose reduction for

 ALARA by means of 


automated exposure control.  


 


 FINDINGS:  


 


 CT ABDOMEN:  


 Lung Bases: No significant abnormality.  


 Liver: Decreased attenuation consistent with hepatic steatosis.  


 Biliary: No significant abnormality.  


 Spleen: No significant abnormality.  Unenlarged.  


 Pancreas: No significant abnormality.  


 Adrenals: No significant abnormality.  


 Kidneys: No significant abnormality.  


 Lymphatics: No lymphadenopathy.  


 Vasculature: No significant abnormality.   


 Bowel/Peritoneum: No significant abnormality. No free air. No free fluid. 

Normal appendix.  


 


 CT PELVIC:  


 : The uterus is surgically absent. There are no concerning pelvic masses.  


 Lymphatics: No lymphadenopathy.  


 


 Osseous Structures: No aggressive appearing osseous lesions.  


 Additional Findings: None  


 


 IMPRESSION:  


 1. No acute findings.  


 2. Hepatic steatosis.  


 


 Signer Name: Elder Cárdenas MD   


 Signed: 12/17/2021 6:02 PM  


 Workstation Name: [id number]   


 


 


Transcribed By: BRENDA  


Dictated By: Elder Cárdenas MD  


Electronically Authenticated By: Elder Cárdenas MD    


Signed Date/Time: 12/17/21 1802                                


 


 


 


DD/DT: 12/17/21 1800                                                            

  


TD/TT:





























- Medical Decision Making





Patient is a 41-year-old female presents emergency room complaints of left lower

 quadrant abdominal pain that increased over the last couple days.  She states 

that she has had changes in her bowel movements and reports that she has bouts 

of diarrhea and constipation.  She has associated nausea she denies any fever, 

vomiting, chills, urinary symptoms, hematochezia, melena, hematemesis.  Patient 

states that she goes to [facility]".  She reports that she had a colonoscopy and 

August and she was diagnosed with IBS and polyps but reports that her pathology 

came back normal.  Allergy to amoxicillin and shellfish.  Vitals are normal.  On

 exam patient has left lower quadrant tenderness, no guarding, no rebound, no 

rigidity, no peritoneal signs, normal bowel sounds.  Labs are stable.  UA is 

within normal limits.  CT abdomen pelvis with p.o. contrast:  1. No acute 

findings.   2. Hepatic steatosis.  Nurse had difficulty obtaining IV, p.o. 

medications given and patient was able to tolerate p.o. intake with no 

difficulty.  Discussed all findings with patient the importance of her following

 up with her GI specialist.  Advised patient Please take medication as 

prescribed.  Increase your fluid intake.  Eat a bland low-fat diet so advance 

her diet as tolerated.  Increase your fiber intake.  Follow-up with a GI doctor.

  Follow-up with your primary care doctor.  Return to emergency room for any new

 or symptoms.


Critical care attestation.: 


If time is entered above; I have spent that time in minutes in the direct care 

of this critically ill patient, excluding procedure time.








ED Disposition


Clinical Impression: 


Abdominal pain


Qualifiers:


 Abdominal location: left lower quadrant Qualified Code(s): R10.32 - Left lower 

quadrant pain





Disposition: 01 HOME / SELF CARE / HOMELESS


Is pt being admited?: No


Does the pt Need Aspirin: No


Condition: Stable


Instructions:  Abdominal Pain, Adult, Easy-to-Read, Abdominal Pain (ED)


Additional Instructions: 


Please take medication as prescribed.  Increase your fluid intake.  Eat a bland 

low-fat diet so advance her diet as tolerated.  Increase your fiber intake.  

Follow-up with a GI doctor.  Follow-up with your primary care doctor.  Return to

 emergency room for any new or symptoms.


Prescriptions: 


Dicyclomine [Bentyl] 10 mg PO QID PRN #30 capsule


 PRN Reason: abd pain


Docusate Sodium [Colace] 100 mg PO BID PRN #30 capsule


 PRN Reason: constipation


Ondansetron [Zofran Odt] 4 mg PO Q8HR PRN #10 tab.rapdis


 PRN Reason: nausea/vomiting


Referrals: 


PRIMARY CARE,MD [Primary Care Provider] - 3-5 Days


your, GI doctor at [place] [Other] - 3-5 Days


Time of Disposition: 18:13


Print Language: ENGLISH

## 2021-12-17 NOTE — CAT SCAN REPORT
CT ABDOMEN AND PELVIS WITHOUT CONTRAST



HISTORY: LLQ abd pain, nausea



COMPARISON: Prior CT on 9/13/2021



TECHNIQUE: Routine abdominal and pelvic CT exam performed Without intravenous contrast. Oral contrast
 was administered.  Lack of intravenous contrast limits evaluation of the vascular and solid organs..
 All CT scans at this location are performed using CT dose reduction for ALARA by means of automated 
exposure control.



FINDINGS:



CT ABDOMEN:

Lung Bases: No significant abnormality.

Liver: Decreased attenuation consistent with hepatic steatosis.

Biliary: No significant abnormality.

Spleen: No significant abnormality.  Unenlarged.

Pancreas: No significant abnormality.

Adrenals: No significant abnormality.

Kidneys: No significant abnormality.

Lymphatics: No lymphadenopathy.

Vasculature: No significant abnormality. 

Bowel/Peritoneum: No significant abnormality. No free air. No free fluid. Normal appendix.



CT PELVIC:

: The uterus is surgically absent. There are no concerning pelvic masses.

Lymphatics: No lymphadenopathy.



Osseous Structures: No aggressive appearing osseous lesions.

Additional Findings: None



IMPRESSION:

1. No acute findings.

2. Hepatic steatosis.



Signer Name: Elder Cárdenas MD 

Signed: 12/17/2021 6:02 PM

Workstation Name: [id number]

## 2022-09-13 ENCOUNTER — HOSPITAL ENCOUNTER (EMERGENCY)
Dept: HOSPITAL 5 - ED | Age: 42
Discharge: LEFT BEFORE DISCHARGE | End: 2022-09-13
Payer: COMMERCIAL

## 2022-09-13 VITALS — SYSTOLIC BLOOD PRESSURE: 126 MMHG | DIASTOLIC BLOOD PRESSURE: 84 MMHG

## 2022-09-13 DIAGNOSIS — R11.10: ICD-10-CM

## 2022-09-13 DIAGNOSIS — Z53.21: ICD-10-CM

## 2022-09-13 DIAGNOSIS — R19.7: Primary | ICD-10-CM

## 2022-09-13 LAB
ALBUMIN SERPL-MCNC: 4.3 G/DL (ref 3.9–5)
ALT SERPL-CCNC: 19 UNITS/L (ref 7–56)
BASOPHILS # (AUTO): 0 K/MM3 (ref 0–0.1)
BASOPHILS NFR BLD AUTO: 0.2 % (ref 0–1.8)
BUN SERPL-MCNC: 8 MG/DL (ref 7–17)
BUN/CREAT SERPL: 11 %
CALCIUM SERPL-MCNC: 9.1 MG/DL (ref 8.4–10.2)
EOSINOPHIL # BLD AUTO: 0 K/MM3 (ref 0–0.4)
EOSINOPHIL NFR BLD AUTO: 0.3 % (ref 0–4.3)
HCT VFR BLD CALC: 40.7 % (ref 30.3–42.9)
HEMOLYSIS INDEX: 10
HGB BLD-MCNC: 13 GM/DL (ref 10.1–14.3)
LYMPHOCYTES # BLD AUTO: 0.9 K/MM3 (ref 1.2–5.4)
LYMPHOCYTES NFR BLD AUTO: 14.3 % (ref 13.4–35)
MCHC RBC AUTO-ENTMCNC: 32 % (ref 30–34)
MCV RBC AUTO: 87 FL (ref 79–97)
MONOCYTES # (AUTO): 0.3 K/MM3 (ref 0–0.8)
MONOCYTES % (AUTO): 4 % (ref 0–7.3)
PLATELET # BLD: 227 K/MM3 (ref 140–440)
RBC # BLD AUTO: 4.68 M/MM3 (ref 3.65–5.03)

## 2022-09-13 PROCEDURE — 80053 COMPREHEN METABOLIC PANEL: CPT

## 2022-09-13 PROCEDURE — 85025 COMPLETE CBC W/AUTO DIFF WBC: CPT

## 2022-09-13 PROCEDURE — 36415 COLL VENOUS BLD VENIPUNCTURE: CPT
